# Patient Record
Sex: MALE | Race: WHITE | NOT HISPANIC OR LATINO
[De-identification: names, ages, dates, MRNs, and addresses within clinical notes are randomized per-mention and may not be internally consistent; named-entity substitution may affect disease eponyms.]

---

## 2019-10-16 ENCOUNTER — APPOINTMENT (OUTPATIENT)
Dept: GERIATRICS | Facility: CLINIC | Age: 84
End: 2019-10-16
Payer: MEDICARE

## 2019-10-16 VITALS
RESPIRATION RATE: 15 BRPM | BODY MASS INDEX: 23.87 KG/M2 | DIASTOLIC BLOOD PRESSURE: 56 MMHG | SYSTOLIC BLOOD PRESSURE: 118 MMHG | TEMPERATURE: 97.8 F | WEIGHT: 139.8 LBS | HEART RATE: 77 BPM | HEIGHT: 64 IN | OXYGEN SATURATION: 97 %

## 2019-10-16 DIAGNOSIS — K21.9 GASTRO-ESOPHAGEAL REFLUX DISEASE W/OUT ESOPHAGITIS: ICD-10-CM

## 2019-10-16 DIAGNOSIS — F32.9 MAJOR DEPRESSIVE DISORDER, SINGLE EPISODE, UNSPECIFIED: ICD-10-CM

## 2019-10-16 DIAGNOSIS — I25.10 ATHEROSCLEROTIC HEART DISEASE OF NATIVE CORONARY ARTERY W/OUT ANGINA PECTORIS: ICD-10-CM

## 2019-10-16 DIAGNOSIS — Z23 ENCOUNTER FOR IMMUNIZATION: ICD-10-CM

## 2019-10-16 DIAGNOSIS — R41.3 OTHER AMNESIA: ICD-10-CM

## 2019-10-16 DIAGNOSIS — H40.9 UNSPECIFIED GLAUCOMA: ICD-10-CM

## 2019-10-16 PROCEDURE — 90662 IIV NO PRSV INCREASED AG IM: CPT

## 2019-10-16 PROCEDURE — 99215 OFFICE O/P EST HI 40 MIN: CPT | Mod: GC,25

## 2019-10-16 PROCEDURE — G0008: CPT

## 2019-10-16 RX ORDER — MIRTAZAPINE 30 MG/1
30 TABLET, FILM COATED ORAL
Refills: 0 | Status: ACTIVE | COMMUNITY
Start: 2019-10-16

## 2019-10-16 RX ORDER — MEMANTINE HYDROCHLORIDE 28 MG/1
28 CAPSULE, EXTENDED RELEASE ORAL
Refills: 0 | Status: ACTIVE | COMMUNITY
Start: 2019-10-16

## 2019-10-16 RX ORDER — DONEPEZIL HYDROCHLORIDE 5 MG/1
5 TABLET ORAL
Qty: 90 | Refills: 1 | Status: ACTIVE | COMMUNITY
Start: 2019-10-16 | End: 1900-01-01

## 2019-10-16 RX ORDER — TRAVOPROST 0.04 MG/ML
0 SOLUTION/ DROPS OPHTHALMIC
Refills: 0 | Status: ACTIVE | COMMUNITY
Start: 2019-10-16

## 2019-10-16 RX ORDER — ESOMEPRAZOLE MAGNESIUM 40 MG/1
40 CAPSULE, DELAYED RELEASE ORAL DAILY
Qty: 90 | Refills: 1 | Status: ACTIVE | COMMUNITY
Start: 2019-10-16 | End: 1900-01-01

## 2019-10-16 RX ORDER — ROSUVASTATIN CALCIUM 10 MG/1
10 TABLET, FILM COATED ORAL
Refills: 0 | Status: ACTIVE | COMMUNITY
Start: 2019-10-16

## 2019-10-16 NOTE — END OF VISIT
[] : Fellow [FreeTextEntry3] : 86yo man, who immigrated from [facility] [place] years ago and has a life-long hx of gastric ulcers, managed on pantoprazole. His 4 children have recently become aware and concerned about deteriorating cognitive status, and he has seen a neurologist, Dr Chahal for the last 1-2 years, currently on Aricept 10mg and Memantine qd. He is the primary caregiver for his elderly wife with dementia/Parkinson who now requires 24/7. They recently started introducing some HHA ~ 6-8 hours/day. PMH: HLD, CAD (s/p 2 stents 10 years ago), depression, memory issues . Has stopped taking Pantoprazole because feared that it was causing memory loss and confusion, which he noticed when "he goes swimming in the apartment complex".\par PE: Appears depressed, overwhelmed, turning to his son to get answers. No current abdominal pain. MMSE 17/30, abnormal clock\par Plan: Resume PPI- Nexium, Start tapering off Aricept. Explained to son GI side-effects of donepezil.\par Must have round the clock supervision: gave copies of MMSE and clock to son , to share with his siblings.\par Must get  for POA and HCP\par Return in 1 month to continue taper of Donepezil.\par \par \par Pt reports stomach pain started when he was [age] 2/2 some infection. Pt reports that he improved after that when moving to the . However, reoccurred about 10 years ago. Pt went to GI doc, who found an ulcer and put pt on pantoprazole, which improved symptoms. Now has recurrent pain. Pt went to new GI who restarted pantoprazole, however stopped taking the medication in the setting of feeling light headed. GI doc reassured pt that this was not due to medications. 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 \par \par Pt reports that he gets appropriate exercise dailiy.

## 2019-10-16 NOTE — PHYSICAL EXAM
[General Appearance - Alert] : alert [General Appearance - In No Acute Distress] : in no acute distress [Sclera] : the sclera and conjunctiva were normal [Extraocular Movements] : extraocular movements were intact [Normal Oral Mucosa] : normal oral mucosa [No Oral Pallor] : no oral pallor [No Oral Cyanosis] : no oral cyanosis [Outer Ear] : the ears and nose were normal in appearance [Oropharynx] : The oropharynx was normal [Neck Appearance] : the appearance of the neck was normal [Neck Cervical Mass (___cm)] : no neck mass was observed [Jugular Venous Distention Increased] : there was no jugular-venous distention [Thyroid Diffuse Enlargement] : the thyroid was not enlarged [Thyroid Nodule] : there were no palpable thyroid nodules [Auscultation Breath Sounds / Voice Sounds] : lungs were clear to auscultation bilaterally [Heart Rate And Rhythm] : heart rate was normal and rhythm regular [Heart Sounds] : normal S1 and S2 [Heart Sounds Gallop] : no gallops [Murmurs] : no murmurs [Heart Sounds Pericardial Friction Rub] : no pericardial rub [Bowel Sounds] : normal bowel sounds [Abdomen Soft] : soft [Abdomen Tenderness] : non-tender [] : no hepato-splenomegaly [Abdomen Mass (___ Cm)] : no abdominal mass palpated [Abnormal Walk] : normal gait [Nail Clubbing] : no clubbing  or cyanosis of the fingernails [Musculoskeletal - Swelling] : no joint swelling seen [Motor Tone] : muscle strength and tone were normal [Deep Tendon Reflexes (DTR)] : deep tendon reflexes were 2+ and symmetric [Sensation] : the sensory exam was normal to light touch and pinprick [No Focal Deficits] : no focal deficits [Oriented To Time, Place, And Person] : oriented to person, place, and time [Impaired Insight] : insight and judgment were intact [Affect] : the affect was normal

## 2019-10-16 NOTE — ASSESSMENT
[FreeTextEntry1] : 87M w/ known gastric ulcers, HLD, CAD (s/p 2 stents 10 years ago), depression, memory issues now presents to the clinic for establishment of care. \par \par Rest of plan as per assessment. \par \par #HCM\par -pt to get flu shot today

## 2019-10-16 NOTE — SOCIAL HISTORY
[One fall no injury in past year] : Patient reported one fall in the past year without injury [Fully functional (bathing, dressing, toileting, transferring, walking, feeding)] : Fully functional (bathing, dressing, toileting, transferring, walking, feeding) [Fully functional (using the telephone, shopping, preparing meals, housekeeping, doing laundry, using transportation,] : Fully functional and needs no help or supervision to perform IADLs (using the telephone, shopping, preparing meals, housekeeping, doing laundry, using transportation, managing medications and managing finances) [Smoke Detector] : smoke detector [Carbon Monoxide Detector] : carbon monoxide detector [Safety elements used in home] : safety elements used in home [Grab Bars] : grab bars [Shower Chair] : shower chair [Night Light] : night light [Anti-Slip Measures] : anti-slip measures [Seat Belt] :  uses seat belt [Sunscreen] : uses sunscreen [Guns at Home] : no guns at home [Travel to Developing Areas] : does not  travel to developing areas [TB Exposure] : no exposure to tuberculosis [Caregiver Concerns] : no caregiver concerns [Driving] : not driving [de-identified] : mechanical fall within last year

## 2019-10-16 NOTE — HISTORY OF PRESENT ILLNESS
[FreeTextEntry1] : CC: stomach pain\par \par HPI: \par 87M w/ known gastric ulcers, HLD, CAD (s/p 2 stents 10 years ago), depression, memory issues now presents to the clinic for establishment of care. \par \par #Stomach pain\par Pt reports stomach pain started when he was [age] 2/2 some infection. Pt reports that he improved after that when moving to the . However, reoccurred about 10 years ago. Pt went to GI doc, who found an ulcer and put pt on pantoprazole, which improved symptoms. Now has recurrent pain. Pt went to new GI who restarted pantoprazole, however stopped taking the medication in the setting of feeling light headed. GI doc reassured pt that this was not due to medications. 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 Pt reports that mood is better on Mirtazapine. \par \par #Memory Deficits \par Pt reports that he has become more forgetful. He has been evaluated in the past and was started on Donepazil and Memantine, however, have not seen any improvement on the memory aspect.

## 2019-10-16 NOTE — REVIEW OF SYSTEMS
[Abdominal Pain] : abdominal pain [Constipation] : constipation [Diarrhea] : diarrhea [Heartburn] : heartburn [Negative] : Heme/Lymph [Vomiting] : no vomiting [Melena] : no melena

## 2019-10-17 ENCOUNTER — MESSAGE (OUTPATIENT)
Age: 84
End: 2019-10-17

## 2019-10-17 PROBLEM — Z23 NEED FOR INFLUENZA VACCINATION: Status: ACTIVE | Noted: 2019-10-17

## 2019-10-30 ENCOUNTER — APPOINTMENT (OUTPATIENT)
Dept: GASTROENTEROLOGY | Facility: CLINIC | Age: 84
End: 2019-10-30

## 2019-11-04 ENCOUNTER — MESSAGE (OUTPATIENT)
Age: 84
End: 2019-11-04

## 2019-11-11 ENCOUNTER — APPOINTMENT (OUTPATIENT)
Dept: CT IMAGING | Facility: IMAGING CENTER | Age: 84
End: 2019-11-11
Payer: MEDICARE

## 2019-11-11 ENCOUNTER — OUTPATIENT (OUTPATIENT)
Dept: OUTPATIENT SERVICES | Facility: HOSPITAL | Age: 84
LOS: 1 days | End: 2019-11-11
Payer: MEDICARE

## 2019-11-11 DIAGNOSIS — N28.89 OTHER SPECIFIED DISORDERS OF KIDNEY AND URETER: ICD-10-CM

## 2019-11-11 PROCEDURE — 71250 CT THORAX DX C-: CPT

## 2019-11-11 PROCEDURE — 71250 CT THORAX DX C-: CPT | Mod: 26

## 2019-11-15 ENCOUNTER — APPOINTMENT (OUTPATIENT)
Dept: VASCULAR SURGERY | Facility: CLINIC | Age: 84
End: 2019-11-15

## 2019-11-20 ENCOUNTER — APPOINTMENT (OUTPATIENT)
Dept: GERIATRICS | Facility: CLINIC | Age: 84
End: 2019-11-20

## 2019-12-18 ENCOUNTER — APPOINTMENT (OUTPATIENT)
Dept: GASTROENTEROLOGY | Facility: CLINIC | Age: 84
End: 2019-12-18